# Patient Record
Sex: MALE | Race: WHITE | NOT HISPANIC OR LATINO | URBAN - METROPOLITAN AREA
[De-identification: names, ages, dates, MRNs, and addresses within clinical notes are randomized per-mention and may not be internally consistent; named-entity substitution may affect disease eponyms.]

---

## 2018-10-30 ENCOUNTER — OFFICE (OUTPATIENT)
Dept: URBAN - METROPOLITAN AREA CLINIC 33 | Facility: CLINIC | Age: 23
End: 2018-10-30

## 2018-10-30 ENCOUNTER — OFFICE (OUTPATIENT)
Dept: URBAN - METROPOLITAN AREA CLINIC 33 | Facility: CLINIC | Age: 23
End: 2018-10-30
Payer: COMMERCIAL

## 2018-10-30 VITALS
DIASTOLIC BLOOD PRESSURE: 76 MMHG | WEIGHT: 228 LBS | SYSTOLIC BLOOD PRESSURE: 132 MMHG | TEMPERATURE: 97.3 F | HEART RATE: 61 BPM | HEIGHT: 72 IN

## 2018-10-30 DIAGNOSIS — K92.1 MELENA: ICD-10-CM

## 2018-10-30 DIAGNOSIS — Z80.0 FAMILY HISTORY OF MALIGNANT NEOPLASM OF DIGESTIVE ORGANS: ICD-10-CM

## 2018-10-30 DIAGNOSIS — R93.5 ABNORMAL FINDINGS ON DIAGNOSTIC IMAGING OF OTHER ABDOMINAL R: ICD-10-CM

## 2018-10-30 DIAGNOSIS — R10.84 GENERALIZED ABDOMINAL PAIN: ICD-10-CM

## 2018-10-30 DIAGNOSIS — R19.4 CHANGE IN BOWEL HABIT: ICD-10-CM

## 2018-10-30 PROCEDURE — 00031: CPT

## 2018-10-30 PROCEDURE — 99244 OFF/OP CNSLTJ NEW/EST MOD 40: CPT

## 2018-10-30 NOTE — SERVICEHPINOTES
LISA CARPENTER   is a   22   year old male who is being seen in consultation at the request of   LEVON DIAZ   for recent onset (1 week) of RLQ pain, change in BMs (up from 1 BM BSS type 3 per day to 3-4 BMs BSS type 4-5 per day), and blood mixed with stool.  Pain is crampy, can range from 4-5 out of 10, can radiate upwards and across abdomen. He denies any prior similar symptoms. He has had 20-25 lb unintentional weight loss over the past 5-6 months. He has not been eating as much but denies any significant change in appetite. No melena, fevers/chills, vomiting. He notes occasional nausea which may precede pain/change in BMs. by a "little bit". He notes heartburn recently (about 1 month). He denies any dysphagia. He denies any fevers or chills. He denies any oral ulcers or joint aches. He has a history of skin rash, possibly psoriasis, but has no evidence at this time. Symptoms started a week ago and got worse on sunday so he went to ER where a CT scan revealed questionable mild wall thickening of the terminal ileum. Pain/bleeding/change in bowel habits have continued since hospital visit.  He denies any family history of IBD. His mother did have colon cancer at age 34.

## 2019-01-08 ENCOUNTER — OFFICE (OUTPATIENT)
Dept: URBAN - METROPOLITAN AREA CLINIC 32 | Facility: CLINIC | Age: 24
End: 2019-01-08

## 2019-01-08 DIAGNOSIS — K92.1 MELENA: ICD-10-CM

## 2019-01-08 DIAGNOSIS — R19.4 CHANGE IN BOWEL HABIT: ICD-10-CM

## 2019-01-08 PROCEDURE — 00003: CPT

## 2019-06-11 ENCOUNTER — OFFICE (OUTPATIENT)
Dept: URBAN - METROPOLITAN AREA CLINIC 78 | Facility: CLINIC | Age: 24
End: 2019-06-11

## 2019-06-11 PROCEDURE — 00014: CPT
